# Patient Record
Sex: FEMALE | Race: WHITE | NOT HISPANIC OR LATINO | Employment: STUDENT | URBAN - METROPOLITAN AREA
[De-identification: names, ages, dates, MRNs, and addresses within clinical notes are randomized per-mention and may not be internally consistent; named-entity substitution may affect disease eponyms.]

---
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
## 2023-04-20 ENCOUNTER — APPOINTMENT (OUTPATIENT)
Dept: RADIOLOGY | Facility: CLINIC | Age: 18
End: 2023-04-20

## 2023-04-20 DIAGNOSIS — M25.561 ACUTE PAIN OF RIGHT KNEE: ICD-10-CM

## 2023-04-20 PROBLEM — S89.91XA RIGHT KNEE INJURY: Status: ACTIVE | Noted: 2023-04-20

## 2023-04-20 PROBLEM — M25.461 EFFUSION OF RIGHT KNEE: Status: ACTIVE | Noted: 2023-04-20

## 2023-04-20 PROBLEM — M23.91: Status: ACTIVE | Noted: 2023-04-20

## 2023-04-21 ENCOUNTER — TELEPHONE (OUTPATIENT)
Dept: OBGYN CLINIC | Facility: CLINIC | Age: 18
End: 2023-04-21

## 2023-04-21 NOTE — TELEPHONE ENCOUNTER
I returned mom [address] phone call  I offered to make an appointment for following up after MRI and going over results  Daughter is back in school and mother is going back to [place] this evening  I advised that I would forward her message to you and that daughter would be able to see results in her [address]

## 2023-04-21 NOTE — TELEPHONE ENCOUNTER
Caller: Pts mom, Laura    Doctor: Nicanor Aldana    Reason for call: Looking for MRI results   Advised it has not yet been read, but will forward message    Call back#: [phone] ; Felix Espinosa

## 2023-04-24 ENCOUNTER — TELEPHONE (OUTPATIENT)
Dept: OBGYN CLINIC | Facility: HOSPITAL | Age: 18
End: 2023-04-24

## 2023-06-13 ENCOUNTER — OFFICE VISIT (OUTPATIENT)
Dept: FAMILY MEDICINE CLINIC | Facility: HOSPITAL | Age: 18
End: 2023-06-13
Payer: COMMERCIAL

## 2023-06-13 VITALS
HEIGHT: 62 IN | WEIGHT: 127 LBS | SYSTOLIC BLOOD PRESSURE: 116 MMHG | TEMPERATURE: 98.8 F | BODY MASS INDEX: 23.37 KG/M2 | DIASTOLIC BLOOD PRESSURE: 64 MMHG

## 2023-06-13 DIAGNOSIS — Z00.00 ANNUAL PHYSICAL EXAM: Primary | ICD-10-CM

## 2023-06-13 DIAGNOSIS — S89.91XS INJURY OF RIGHT KNEE, SEQUELA: ICD-10-CM

## 2023-06-13 PROCEDURE — 99395 PREV VISIT EST AGE 18-39: CPT | Performed by: NURSE PRACTITIONER

## 2023-06-13 NOTE — PROGRESS NOTES
[address] [facility] [address]     NAME: Parish Adan  AGE: 19 y o  SEX: female  : 2005     DATE: 2023     Assessment and Plan:     Problem List Items Addressed This Visit        Other    Right knee injury     scheduled for surgery at [facility] this week        Other Visit Diagnoses     Annual physical exam    -  Primary    PE updated, next due in 1 year; continue f/u with gyn as planned          Immunizations and preventive care screenings were discussed with patient today  Appropriate education was printed on patient's after visit summary  Counseling:  Alcohol/drug use: discussed moderation in alcohol intake, the recommendations for healthy alcohol use, and avoidance of illicit drug use  Dental Health: discussed importance of regular tooth brushing, flossing, and dental visits  Sexual health: discussed sexually transmitted diseases, partner selection, use of condoms, avoidance of unintended pregnancy, and contraceptive alternatives  · Exercise: the importance of regular exercise/physical activity was discussed  Recommend exercise 3-5 times per week for at least 30 minutes  Depression Screening and Follow-up Plan: Patient was screened for depression during today's encounter  They screened negative with a PHQ-2 score of 0  Return in about 1 year (around 2024) for Annual physical      Chief Complaint:     Chief Complaint   Patient presents with   • Physical Exam      History of Present Illness:     Adult Annual Physical   Patient here for a comprehensive physical exam  The patient reports problems - per ROS  Diet and Physical Activity  · Diet/Nutrition: well balanced diet and limited junk food  · Exercise: vigorous cardiovascular exercise   Currently not exercising due to knee injury     Depression Screening  PHQ-2/9 Depression Screening    Little interest or pleasure in doing things: 0 - not at all  Feeling down, depressed, or hopeless: 0 - not at all  PHQ-2 Score: 0  PHQ-2 Interpretation: Negative depression screen       General Health  · Sleep: sleeps well  · Hearing: normal - bilateral   · Vision: no vision problems and goes for regular eye exams  · Dental: regular dental visits  /GYN Health  · Last menstrual period: 6/2023  · Contraceptive method: oral contraceptives  Review of Systems:     Review of Systems   Constitutional: Positive for activity change (due to right knee injury)  HENT: Negative  Eyes: Negative  Respiratory: Negative  Cardiovascular: Negative  Gastrointestinal: Negative  Genitourinary: Negative  Musculoskeletal: Positive for arthralgias (right knee soccer injury - ACL, possible MCL tears - scheduled for surgery @ [facility] this week)  Skin: Negative  Neurological: Negative  Psychiatric/Behavioral: Negative  Past Medical History:     Past Medical History:   Diagnosis Date   • Asthma    • Migraines    • Mild intermittent asthma, uncomplicated 65/33/9186    Formatting of this note might be different from the original  Doing much better  No on maintenance ICS for 1 year   Albuterol prn now      Past Surgical History:     Past Surgical History:   Procedure Laterality Date   • KNEE SURGERY     • MYRINGOTOMY W/ TUBES     • SINUS SURGERY     • WISDOM TOOTH EXTRACTION        Social History:     Social History     Socioeconomic History   • Marital status: Single     Spouse name: None   • Number of children: None   • Years of education: None   • Highest education level: None   Occupational History   • None   Tobacco Use   • Smoking status: Never   • Smokeless tobacco: Never   Vaping Use   • Vaping Use: Never used   Substance and Sexual Activity   • Alcohol use: Never   • Drug use: Never   • Sexual activity: Yes     Partners: Male     Birth control/protection: OCP   Other Topics Concern   • None   Social History Narrative   • None     Social "Determinants of Health     Financial Resource Strain: Not on file   Food Insecurity: Not on file   Transportation Needs: Not on file   Physical Activity: Not on file   Stress: Not on file   Social Connections: Not on file   Intimate Partner Violence: Not on file   Housing Stability: Not on file      Family History:     Family History   Problem Relation Age of Onset   • No Known Problems Mother    • Heart disease Father    • Hypertension Father    • Coronary artery disease Father    • Diabetes Father    • Prostate cancer Paternal Grandfather    • Breast cancer Neg Hx    • Uterine cancer Neg Hx    • Ovarian cancer Neg Hx    • Colon cancer Neg Hx       Current Medications:     Current Outpatient Medications   Medication Sig Dispense Refill   • norethindrone-ethinyl estradiol (Junel 1/20) 1-20 MG-MCG per tablet Take 1 tablet by mouth daily 90 tablet 0   • acetaminophen (TYLENOL) 500 mg tablet Take 500 mg by mouth every 6 (six) hours as needed for mild pain (Patient not taking: Reported on 6/13/2023)       No current facility-administered medications for this visit  Allergies:     No Known Allergies   Physical Exam:     /64   Temp 98 8 °F (37 1 °C)   Ht 5' 1 5\" (1 562 m)   Wt 57 6 kg (127 lb)   BMI 23 61 kg/m²     Physical Exam  Vitals reviewed  Constitutional:       General: She is not in acute distress  Appearance: Normal appearance  HENT:      Head: Normocephalic  Right Ear: Tympanic membrane normal       Left Ear: Tympanic membrane normal       Nose: Nose normal       Mouth/Throat:      Mouth: Mucous membranes are moist       Pharynx: Oropharynx is clear  Eyes:      General: No scleral icterus  Neck:      Thyroid: No thyromegaly  Cardiovascular:      Rate and Rhythm: Normal rate and regular rhythm  Heart sounds: No murmur heard  Pulmonary:      Effort: Pulmonary effort is normal  No respiratory distress  Breath sounds: Normal breath sounds     Abdominal:      General: " Abdomen is flat  Bowel sounds are normal       Palpations: Abdomen is soft  Tenderness: There is no abdominal tenderness  There is no guarding or rebound  Musculoskeletal:         General: Signs of injury (right knee, wearing leg brace) present  Normal range of motion  Cervical back: Normal range of motion  Right lower leg: No edema  Left lower leg: No edema  Lymphadenopathy:      Cervical: No cervical adenopathy  Skin:     General: Skin is warm and dry  Neurological:      General: No focal deficit present  Mental Status: She is alert and oriented to person, place, and time  Psychiatric:         Mood and Affect: Mood normal          Behavior: Behavior normal          Thought Content:  Thought content normal          Judgment: Judgment normal           TISHA Levine   [address]  [address]

## 2024-04-08 NOTE — PROGRESS NOTES
"  [facility]   Seble Martins DO    Assessment/Plan:      Diagnosis ICD-10-CM Associated Orders   1. Near syncope  R55 Ambulatory Referral to Cardiology     Holter monitor     Basic metabolic panel     CBC and Platelet     TSH, 3rd generation     T4, free     Magnesium     Phosphorus     Basic metabolic panel     CBC and Platelet     TSH, 3rd generation     T4, free     Magnesium     Phosphorus      2. Racing heart beat  R00.0 Ambulatory Referral to Cardiology     Holter monitor     Basic metabolic panel     CBC and Platelet     TSH, 3rd generation     T4, free     Magnesium     Phosphorus     Basic metabolic panel     CBC and Platelet     TSH, 3rd generation     T4, free     Magnesium     Phosphorus      3. Leukocytosis, unspecified type  D72.829 CBC and Platelet     CBC and Platelet      4. Family history of hypothyroidism  Z83.49 TSH, 3rd generation     T4, free     TSH, 3rd generation     T4, free        Orders as above for BW. R/O thyroid causation among others.   Have Holter completed & f/u with cardiology for further review of s/s & EKG & holter. Will likely need \"cardiac clearance\" for full duty fall soccer. Will possibly need ECHO at that time as well.   I did give a school/ sports note today in office.   Stay well hydrated & have snacks nearby.   Could be 2/2 viral illness over holidays/ long haul covid? Post viral palpitations/tachycardia?   Meds reviewed. OCP likely not playing role.   Return if symptoms worsen or fail to improve.  See PCP as typically scheduled.   Patient may call or return to office with any questions or concerns.   ______________________________________________________________________  Subjective:     Patient ID: Valery Frazier is a 19 y.o. female.  Valery Frazier  Chief Complaint   Patient presents with   • Fatigue     Feels like HR is high and feels like may pass out      Last Monday while at [employer] game -   While at [place] event, hot room, started sweating, " eyesight seemed altered. Blurry. Black spots also. Not dehydrated to her knowledge.   Ate some [employer] sandwiches, 3 hr drive, then changed cars, drove further to game.   Presyncope. Given water & cold towel.   Told vitals were nml.   Had taken a sinus med that day also. Not again since. COVID neg at that time.   Next day HR was 130's all day.     Again happened Saturday, walking dog, a mile. Felt shaky.   Not hot out. Had just eaten.   Drinking tons of water that day.   Not as high of HR this time.     Only med is OCP. No changes.     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     Running has her HR at 180-190 & lifting can be 170s.   Had COVID in 2020 Nov.     CTH & CXR normal.   No fam HX of cardiac concerns. Only GF had MI in 70's.     Wt Readings from Last 3 Encounters:   04/09/24 55.3 kg (122 lb)   06/13/23 57.6 kg (127 lb)   04/20/23 54.4 kg (120 lb)     Temp Readings from Last 3 Encounters:   06/13/23 98.8 °F (37.1 °C)   06/06/22 98.7 °F (37.1 °C)   06/02/19 (!) 101.2 °F (38.4 °C)     BP Readings from Last 3 Encounters:   04/09/24 100/72   06/13/23 116/64   04/20/23 120/82     Pulse Readings from Last 3 Encounters:   04/09/24 105   06/06/22 72   06/02/19 (!) 120      The following portions of the patient's history were reviewed and updated as appropriate: allergies, current medications, past medical history, and problem list.    Review of Systems   Constitutional:  Positive for fatigue.   Eyes:  Positive for visual disturbance. Negative for photophobia.   Respiratory:  Negative for cough, chest tightness and shortness of breath.    Cardiovascular:  Positive for palpitations (feels faster, not skipping). Negative for chest pain and leg swelling.   Gastrointestinal:  Negative for diarrhea, nausea and vomiting.   Genitourinary:  Negative for dysuria and urgency.   Neurological:  Positive for light-headedness. Negative for dizziness and headaches.   Psychiatric/Behavioral:  Negative for sleep disturbance.        Objective:   "    Vitals:    04/09/24 1419   BP: 100/72   Pulse: 105   SpO2: 99%   RPT - 89 while sitting longer      Physical Exam  Vitals and nursing note reviewed.   Constitutional:       General: She is not in acute distress.     Appearance: Normal appearance. She is normal weight. She is not ill-appearing.   HENT:      Head: Normocephalic and atraumatic.   Eyes:      General: No scleral icterus.        Right eye: No discharge.         Left eye: No discharge.   Neck:      Comments: No thyroid nodules or thyromegaly.  Cardiovascular:      Rate and Rhythm: Regular rhythm. Tachycardia present.      Pulses: Normal pulses.      Heart sounds: Normal heart sounds. No murmur heard.  Pulmonary:      Effort: Pulmonary effort is normal. No respiratory distress.      Breath sounds: Normal breath sounds. No stridor. No wheezing.   Musculoskeletal:      Cervical back: Normal range of motion and neck supple. No rigidity or tenderness.      Right lower leg: No edema.      Left lower leg: No edema.   Lymphadenopathy:      Cervical: No cervical adenopathy.   Neurological:      Mental Status: She is alert and oriented to person, place, and time.      Gait: Gait normal.   Psychiatric:         Mood and Affect: Mood normal.         Behavior: Behavior normal.         Thought Content: Thought content normal.         Judgment: Judgment normal.      Comments: Does not appear very anxious           Portions of the record may have been created with voice recognition software. Occasional wrong word or \"sound alike\" substitutions may have occurred due to the inherent limitations of voice recognition software. Please review the chart carefully and recognize, using context, where substitutions/typographical errors may have occurred.     "

## 2024-04-09 ENCOUNTER — OFFICE VISIT (OUTPATIENT)
Dept: FAMILY MEDICINE CLINIC | Facility: HOSPITAL | Age: 19
End: 2024-04-09
Payer: COMMERCIAL

## 2024-04-09 VITALS
HEART RATE: 105 BPM | BODY MASS INDEX: 22.45 KG/M2 | OXYGEN SATURATION: 99 % | DIASTOLIC BLOOD PRESSURE: 72 MMHG | HEIGHT: 62 IN | SYSTOLIC BLOOD PRESSURE: 100 MMHG | WEIGHT: 122 LBS

## 2024-04-09 DIAGNOSIS — Z83.49 FAMILY HISTORY OF HYPOTHYROIDISM: ICD-10-CM

## 2024-04-09 DIAGNOSIS — R00.0 RACING HEART BEAT: ICD-10-CM

## 2024-04-09 DIAGNOSIS — D72.829 LEUKOCYTOSIS, UNSPECIFIED TYPE: ICD-10-CM

## 2024-04-09 DIAGNOSIS — R55 NEAR SYNCOPE: Primary | ICD-10-CM

## 2024-04-09 PROBLEM — S83.511A: Status: ACTIVE | Noted: 2023-06-12

## 2024-04-09 PROCEDURE — 99214 OFFICE O/P EST MOD 30 MIN: CPT | Performed by: STUDENT IN AN ORGANIZED HEALTH CARE EDUCATION/TRAINING PROGRAM

## 2024-04-09 RX ORDER — NORETHINDRONE ACETATE AND ETHINYL ESTRADIOL AND FERROUS FUMARATE 1MG-20(21)
1 KIT ORAL DAILY
COMMUNITY
Start: 2024-01-31

## 2024-04-10 ENCOUNTER — HOSPITAL ENCOUNTER (OUTPATIENT)
Dept: NON INVASIVE DIAGNOSTICS | Age: 19
Discharge: HOME | End: 2024-04-10
Payer: COMMERCIAL

## 2024-04-10 DIAGNOSIS — R00.0 RACING HEART BEAT: ICD-10-CM

## 2024-04-10 DIAGNOSIS — R55 NEAR SYNCOPE: ICD-10-CM

## 2024-04-10 PROCEDURE — 93226 XTRNL ECG REC<48 HR SCAN A/R: CPT

## 2024-04-10 PROCEDURE — 93225 XTRNL ECG REC<48 HRS REC: CPT

## 2024-04-16 LAB
BUN SERPL-MCNC: 16 MG/DL (ref 6–20)
BUN/CREAT SERPL: 21 (ref 9–23)
CALCIUM SERPL-MCNC: 9.7 MG/DL (ref 8.7–10.2)
CHLORIDE SERPL-SCNC: 104 MMOL/L (ref 96–106)
CO2 SERPL-SCNC: 22 MMOL/L (ref 20–29)
CREAT SERPL-MCNC: 0.76 MG/DL (ref 0.57–1)
EGFR: 114 ML/MIN/1.73
ERYTHROCYTE [DISTWIDTH] IN BLOOD BY AUTOMATED COUNT: 12.2 % (ref 11.7–15.4)
GLUCOSE SERPL-MCNC: 91 MG/DL (ref 70–99)
HCT VFR BLD AUTO: 43.5 % (ref 34–46.6)
HGB BLD-MCNC: 14.1 G/DL (ref 11.1–15.9)
MAGNESIUM SERPL-MCNC: 1.9 MG/DL (ref 1.6–2.3)
MCH RBC QN AUTO: 29.5 PG (ref 26.6–33)
MCHC RBC AUTO-ENTMCNC: 32.4 G/DL (ref 31.5–35.7)
MCV RBC AUTO: 91 FL (ref 79–97)
PHOSPHATE SERPL-MCNC: 3.4 MG/DL (ref 3–4.3)
PLATELET # BLD AUTO: 371 X10E3/UL (ref 150–450)
POTASSIUM SERPL-SCNC: 4.4 MMOL/L (ref 3.5–5.2)
RBC # BLD AUTO: 4.78 X10E6/UL (ref 3.77–5.28)
SODIUM SERPL-SCNC: 138 MMOL/L (ref 134–144)
T4 FREE SERPL-MCNC: 1.17 NG/DL (ref 0.82–1.77)
TSH SERPL DL<=0.005 MIU/L-ACNC: 0.95 UIU/ML (ref 0.45–4.5)
WBC # BLD AUTO: 12.7 X10E3/UL (ref 3.4–10.8)

## 2024-08-12 ENCOUNTER — OFFICE VISIT (OUTPATIENT)
Dept: FAMILY MEDICINE CLINIC | Facility: HOSPITAL | Age: 19
End: 2024-08-12
Payer: COMMERCIAL

## 2024-08-12 VITALS
TEMPERATURE: 97.2 F | SYSTOLIC BLOOD PRESSURE: 122 MMHG | WEIGHT: 118.6 LBS | BODY MASS INDEX: 21.83 KG/M2 | HEIGHT: 62 IN | OXYGEN SATURATION: 98 % | DIASTOLIC BLOOD PRESSURE: 72 MMHG | HEART RATE: 84 BPM

## 2024-08-12 DIAGNOSIS — F41.9 ANXIETY: Primary | ICD-10-CM

## 2024-08-12 PROCEDURE — 99214 OFFICE O/P EST MOD 30 MIN: CPT | Performed by: NURSE PRACTITIONER

## 2024-08-12 RX ORDER — ESCITALOPRAM OXALATE 10 MG/1
10 TABLET ORAL DAILY
Qty: 30 TABLET | Refills: 1 | Status: SHIPPED | OUTPATIENT
Start: 2024-08-12

## 2024-08-12 NOTE — ASSESSMENT & PLAN NOTE
MECHE-7=12  PHQ-9=0  We discussed prn vs daily options for anxiety.   She is interested in daily option.   Will start on lexapro. AE discussed. Black box warning discussed.   She will call with any worsening mood or SI.   Plan to f/u in 4 weeks virtually since she is returning to school today.

## 2024-08-12 NOTE — PROGRESS NOTES
Ambulatory Visit  Name: Valery Frazier      : 2005      MRN: [id number]  Encounter Provider: TISHA Conner  Encounter Date: 2024   Encounter department: [facility] [address]     Assessment & Plan   1. Anxiety  Assessment & Plan:  MECHE-7=12  PHQ-9=0  We discussed prn vs daily options for anxiety.   She is interested in daily option.   Will start on lexapro. AE discussed. Black box warning discussed.   She will call with any worsening mood or SI.   Plan to f/u in 4 weeks virtually since she is returning to school today.     Orders:  -     escitalopram (Lexapro) 10 mg tablet; Take 1 tablet (10 mg total) by mouth daily      Depression Screening and Follow-up Plan: Patient was screened for depression during today's encounter. They screened negative with a PHQ-2 score of 0.        History of Present Illness     Feels anxious all the time. Has been going on for about 3-4 months. Denies any triggers. In college but anxiety is there even though she has been out of school. Denies h/o anxiety or depression. Anxiety comes in waves. There daily but waxes and wanes in intensity. Had syncopal event at school that could have been anxiety. Reports she was feeling nervous prior to syncopal event. She reports she was worked up for this and everything was normal.       Review of Systems   Constitutional:  Negative for appetite change, fatigue and unexpected weight change.   Psychiatric/Behavioral:  Negative for agitation, decreased concentration, dysphoric mood, sleep disturbance and suicidal ideas. The patient is nervous/anxious. The patient is not hyperactive.      Past Medical History:   Diagnosis Date    Asthma     Migraines     Mild intermittent asthma, uncomplicated 2020    Formatting of this note might be different from the original. Doing much better.  No on maintenance ICS for 1 year. Albuterol prn now     Past Surgical History:   Procedure Laterality Date    KNEE SURGERY       MYRINGOTOMY W/ TUBES      SINUS SURGERY      WISDOM TOOTH EXTRACTION       Family History   Problem Relation Age of Onset    No Known Problems Mother     Heart disease Father     Hypertension Father     Coronary artery disease Father     Diabetes Father     Prostate cancer Paternal Grandfather     Breast cancer Neg Hx     Uterine cancer Neg Hx     Ovarian cancer Neg Hx     Colon cancer Neg Hx      Social History     Tobacco Use    Smoking status: Never    Smokeless tobacco: Never   Vaping Use    Vaping status: Never Used   Substance and Sexual Activity    Alcohol use: Never    Drug use: Never    Sexual activity: Yes     Partners: Male     Birth control/protection: OCP     Current Outpatient Medications on File Prior to Visit   Medication Sig    Aurovela FE 1/20 1-20 MG-MCG per tablet Take 1 tablet by mouth daily     No Known Allergies  Immunization History   Administered Date(s) Administered    COVID-19 PFIZER VACCINE 0.3 ML IM 04/16/2021, 05/07/2021, 01/03/2022    DTaP 2003, 2003, 2003, 11/08/2004, 02/26/2007    HPV Quadrivalent 05/07/2014, 07/15/2014, 12/04/2014    Hep B, Adolescent or Pediatric 2003, 2003, 2003    Hepatitis A 02/26/2007, 02/19/2008    Hib (PRP-T) 2003, 2003, 2003, 11/08/2004    INFLUENZA 2003, 2003, 10/30/2004, 10/12/2005, 10/20/2006, 10/16/2007, 09/25/2008, 09/30/2011, 09/27/2012, 10/02/2013, 10/07/2014, 09/27/2015, 10/08/2018    IPV 2003, 2003, 11/08/2004, 02/26/2007    Influenza Injectable, MDCK, Preservative Free, Quadrivalent, 0.5 mL 10/14/2019    Influenza LAIV (Nasal) 09/22/2009    Influenza Quadrivalent Preservative Free 3 years and older IM 09/12/2020    MMR 02/17/2004, 02/19/2008    Meningococcal B, OMV (BEXSERO) 07/23/2020, 11/05/2020    Meningococcal Conjugate (MCV4O) 07/22/2019    Meningococcal MCV4, Unspecified 05/07/2014, 07/22/2019    Meningococcal MCV4P 05/07/2014, 07/22/2019    Pneumococcal Conjugate  "PCV 7 2003, 2003, 2003, 11/08/2004    Tdap 05/07/2014    Varicella 02/17/2004, 02/19/2008     Objective     /72 (BP Location: Left arm, Patient Position: Sitting, Cuff Size: Standard)   Pulse 84   Temp (!) 97.2 °F (36.2 °C) (Tympanic)   Ht 5' 2\" (1.575 m)   Wt 53.8 kg (118 lb 9.6 oz)   SpO2 98%   BMI 21.69 kg/m²     Physical Exam  Vitals reviewed.   Constitutional:       Appearance: Normal appearance. She is normal weight.   Cardiovascular:      Rate and Rhythm: Normal rate and regular rhythm.      Heart sounds: Normal heart sounds. No murmur heard.  Pulmonary:      Effort: Pulmonary effort is normal.      Breath sounds: Normal breath sounds.   Skin:     General: Skin is warm and dry.   Neurological:      Mental Status: She is alert and oriented to person, place, and time.   Psychiatric:         Mood and Affect: Mood normal.         Behavior: Behavior normal.         Thought Content: Thought content normal.         Judgment: Judgment normal.       Administrative Statements   I have spent a total time of 15 minutes in caring for this patient on the day of the visit/encounter including Risks and benefits of tx options, Instructions for management, Counseling / Coordination of care, Documenting in the medical record, Reviewing / ordering tests, medicine, procedures  , and Obtaining or reviewing history  .  "

## 2024-09-11 ENCOUNTER — TELEMEDICINE (OUTPATIENT)
Dept: FAMILY MEDICINE CLINIC | Facility: HOSPITAL | Age: 19
End: 2024-09-11
Payer: COMMERCIAL

## 2024-09-11 VITALS — HEIGHT: 62 IN | WEIGHT: 118 LBS | BODY MASS INDEX: 21.71 KG/M2

## 2024-09-11 DIAGNOSIS — F41.9 ANXIETY: Primary | ICD-10-CM

## 2024-09-11 PROCEDURE — 99213 OFFICE O/P EST LOW 20 MIN: CPT | Performed by: NURSE PRACTITIONER

## 2024-09-11 RX ORDER — ESCITALOPRAM OXALATE 10 MG/1
10 TABLET ORAL DAILY
Qty: 90 TABLET | Refills: 1 | Status: SHIPPED | OUTPATIENT
Start: 2024-09-11

## 2024-09-11 NOTE — PROGRESS NOTES
"Virtual Regular Visit  Name: Valery Frazier      : 2005      MRN: [id number]  Encounter Provider: TISHA Conner  Encounter Date: 2024   Encounter department: [facility] CARE [address]     Verification of patient location:    Patient is located at Other in the following state in which I hold an active license [place]    Assessment & Plan  Anxiety  MECHE-7=0  She feels overall her anxiety has significantly improved. She is tolerating well.   She is requesting stay on lexapro 10 mg.   Plan to f/u in 6 months in office during her winter break.   She will call in the interim should she desire dose increase or have any problems.     Orders:    escitalopram (Lexapro) 10 mg tablet; Take 1 tablet (10 mg total) by mouth daily         Encounter provider TISHA Conner    The patient was identified by name and date of birth. Valery Frazier was informed that this is a telemedicine visit and that the visit is being conducted through the Epic Embedded platform. She agrees to proceed..  My office door was closed. No one else was in the room.  She acknowledged consent and understanding of privacy and security of the video platform. The patient has agreed to participate and understands they can discontinue the visit at any time.    Patient is aware this is a billable service.     History of Present Illness     Feeling calmer overall. Started lexapro. No side effects. Tolerating it well. Thinks she has had about 75% reduction in symptoms.         History obtained from : patient  Review of Systems   Constitutional:  Negative for appetite change and fatigue.   Psychiatric/Behavioral:  Negative for decreased concentration and sleep disturbance. The patient is not nervous/anxious and is not hyperactive.            Objective     Ht 5' 2\" (1.575 m)   Wt 53.5 kg (118 lb)   BMI 21.58 kg/m²   Physical Exam  Vitals reviewed.   Constitutional:       General: She is not in acute distress.     Appearance: " Normal appearance.   Pulmonary:      Effort: Pulmonary effort is normal.   Neurological:      Mental Status: She is alert and oriented to person, place, and time.   Psychiatric:         Mood and Affect: Mood normal.         Behavior: Behavior normal.         Thought Content: Thought content normal.         Judgment: Judgment normal.         Visit Time  Total Visit Duration: 10 min

## 2024-09-11 NOTE — ASSESSMENT & PLAN NOTE
MECHE-7=0  She feels overall her anxiety has significantly improved. She is tolerating well.   She is requesting stay on lexapro 10 mg.   Plan to f/u in 6 months in office during her winter break.   She will call in the interim should she desire dose increase or have any problems.     Orders:    escitalopram (Lexapro) 10 mg tablet; Take 1 tablet (10 mg total) by mouth daily

## 2024-09-12 ENCOUNTER — TELEPHONE (OUTPATIENT)
Dept: FAMILY MEDICINE CLINIC | Facility: HOSPITAL | Age: 19
End: 2024-09-12

## 2024-09-12 NOTE — TELEPHONE ENCOUNTER
Left message for call back to office.    Please schedule an in office follow-up w/ Yari Winter for during patient's winter break from school.

## 2024-09-25 ENCOUNTER — TELEMEDICINE (OUTPATIENT)
Dept: FAMILY MEDICINE CLINIC | Facility: HOSPITAL | Age: 19
End: 2024-09-25
Payer: COMMERCIAL

## 2024-09-25 ENCOUNTER — TELEPHONE (OUTPATIENT)
Age: 19
End: 2024-09-25

## 2024-09-25 VITALS — HEIGHT: 62 IN | WEIGHT: 118 LBS | BODY MASS INDEX: 21.71 KG/M2

## 2024-09-25 DIAGNOSIS — J06.9 VIRAL URI: Primary | ICD-10-CM

## 2024-09-25 PROCEDURE — 99213 OFFICE O/P EST LOW 20 MIN: CPT | Performed by: NURSE PRACTITIONER

## 2024-09-25 NOTE — PROGRESS NOTES
"Virtual Regular Visit  Name: Valery Frazier      : 2005      MRN: [id number]  Encounter Provider: TISHA Conner  Encounter Date: 2024   Encounter department: [facility] [address]     Verification of patient location:    Patient is located at Other in the following state in which I hold an active license [place]    Assessment & Plan  Viral URI  Home COVID test done yesterday was negative.   Her symptoms are of short duration.   We discussed viral etiology and symptomatic support.   Instructed to call if no better in 1 week or any significant worsening.             Encounter provider TISHA Conner    The patient was identified by name and date of birth. Valery Frazier was informed that this is a telemedicine visit and that the visit is being conducted through the Epic Embedded platform. She agrees to proceed..  My office door was closed. No one else was in the room.  She acknowledged consent and understanding of privacy and security of the video platform. The patient has agreed to participate and understands they can discontinue the visit at any time.    Patient is aware this is a billable service.     History of Present Illness     Has nasal congestion and sinus pressure, sore throat, ear pressure but no pain. Had chills yesterday. Did not check temperature. No fever today. Taking OTC cold and cough med. No N/V/D. Symptoms started 4 days ago. Took COVID test yesterday and was negative. She is away at college.         History obtained from : patient  Review of Systems   Constitutional:  Positive for chills. Negative for fever.   HENT:  Positive for congestion, sinus pressure and sore throat. Negative for ear pain.    Respiratory:  Positive for cough. Negative for shortness of breath and wheezing.    Gastrointestinal:  Negative for diarrhea, nausea and vomiting.   Neurological:  Positive for headaches.           Objective     Ht 5' 2\" (1.575 m)   Wt 53.5 kg (118 lb)   BMI " 21.58 kg/m²   Physical Exam  Vitals reviewed.   Constitutional:       General: She is not in acute distress.     Appearance: Normal appearance. She is not ill-appearing.   Pulmonary:      Effort: Pulmonary effort is normal.      Comments: No cough during visit  Neurological:      Mental Status: She is alert and oriented to person, place, and time.   Psychiatric:         Mood and Affect: Mood normal.         Behavior: Behavior normal.         Thought Content: Thought content normal.         Judgment: Judgment normal.         Visit Time  Total Visit Duration: 10 min

## 2024-09-25 NOTE — TELEPHONE ENCOUNTER
Pt called and stated her appt today at 11 was scheduled for in person and she needed it virtually. Changed appt type on appt desk, letting office know just in case thank you.

## 2024-12-13 ENCOUNTER — OFFICE VISIT (OUTPATIENT)
Dept: FAMILY MEDICINE CLINIC | Facility: HOSPITAL | Age: 19
End: 2024-12-13
Payer: COMMERCIAL

## 2024-12-13 VITALS
TEMPERATURE: 97.8 F | DIASTOLIC BLOOD PRESSURE: 74 MMHG | BODY MASS INDEX: 24.22 KG/M2 | HEIGHT: 62 IN | HEART RATE: 80 BPM | WEIGHT: 131.6 LBS | SYSTOLIC BLOOD PRESSURE: 124 MMHG

## 2024-12-13 DIAGNOSIS — F41.9 ANXIETY: Primary | ICD-10-CM

## 2024-12-13 PROBLEM — R55 NEAR SYNCOPE: Status: RESOLVED | Noted: 2024-04-09 | Resolved: 2024-12-13

## 2024-12-13 PROBLEM — S83.511A: Status: RESOLVED | Noted: 2023-06-12 | Resolved: 2024-12-13

## 2024-12-13 PROCEDURE — 99213 OFFICE O/P EST LOW 20 MIN: CPT | Performed by: NURSE PRACTITIONER

## 2024-12-13 NOTE — PROGRESS NOTES
"Name: Valery Frazier      : 2005      MRN: [id number]  Encounter Provider: TISHA Patel  Encounter Date: 2024   Encounter department: [facility] [address]   :  Assessment & Plan  Anxiety  Mood stable on daily daily lexapro  Will continue same dose as she desires - denies need for refill today  Return to see PCP in 6 months       Update annual PE at next appt       History of Present Illness       Here for med check. Has been taking lexapro for a few months for anxiety. States she is feeling better. Had constant nervousness and panic attacks before starting. Her anxiety symptoms have resolved. Denies side effects.   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 May move to [place] after graduating.        Review of Systems   Psychiatric/Behavioral:  Negative for dysphoric mood. The patient is nervous/anxious (improved on lexapro).        Objective   /74   Pulse 80   Temp 97.8 °F (36.6 °C)   Ht 5' 2\" (1.575 m)   Wt 59.7 kg (131 lb 9.6 oz)   BMI 24.07 kg/m²        Physical Exam  Vitals reviewed.   Constitutional:       General: She is not in acute distress.     Appearance: Normal appearance.   HENT:      Head: Normocephalic.   Eyes:      General: No scleral icterus.  Neck:      Thyroid: No thyromegaly.   Cardiovascular:      Rate and Rhythm: Normal rate and regular rhythm.   Pulmonary:      Effort: Pulmonary effort is normal. No respiratory distress.      Breath sounds: Normal breath sounds.   Musculoskeletal:      Cervical back: Normal range of motion.   Lymphadenopathy:      Cervical: No cervical adenopathy.   Skin:     General: Skin is warm and dry.   Neurological:      General: No focal deficit present.      Mental Status: She is alert and oriented to person, place, and time.   Psychiatric:         Mood and Affect: Mood normal.         Behavior: Behavior normal.         Thought Content: Thought content normal.         Judgment: Judgment normal.      Comments: " Relaxed, freely conversive w/good eye contact         Administrative Statements   I have spent a total time of 15 minutes in caring for this patient on the day of the visit/encounter including Instructions for management, Patient and family education, Importance of tx compliance, Impressions, Counseling / Coordination of care, Documenting in the medical record, Reviewing / ordering tests, medicine, procedures  , and Obtaining or reviewing history

## 2024-12-13 NOTE — ASSESSMENT & PLAN NOTE
Mood stable on daily daily lexapro  Will continue same dose as she desires - denies need for refill today  Return to see PCP in 6 months

## 2025-02-17 ENCOUNTER — TELEMEDICINE (OUTPATIENT)
Dept: FAMILY MEDICINE CLINIC | Facility: HOSPITAL | Age: 20
End: 2025-02-17
Payer: COMMERCIAL

## 2025-02-17 VITALS — WEIGHT: 125 LBS | BODY MASS INDEX: 23 KG/M2 | HEIGHT: 62 IN

## 2025-02-17 DIAGNOSIS — R14.0 ABDOMINAL BLOATING: ICD-10-CM

## 2025-02-17 DIAGNOSIS — R10.84 GENERALIZED ABDOMINAL PAIN: Primary | ICD-10-CM

## 2025-02-17 PROCEDURE — 99214 OFFICE O/P EST MOD 30 MIN: CPT | Performed by: NURSE PRACTITIONER

## 2025-02-17 NOTE — PROGRESS NOTES
Virtual Regular VisitName: Valery Frazier      : 2005      MRN: [id number]  Encounter Provider: TISHA Conner  Encounter Date: 2025   Encounter department: [facility] CARE [address]   :  Assessment & Plan  Generalized abdominal pain  Given symptoms have been waxing and waning for a few months will go ahead and order labs and US.   We discussed if above w/u is negative she can certainly try eliminating gluten but would need to be strict gluten free for a minimum of 8 weeks.   She could also do the same for dairy if she feels her symptoms are correlated with ingestion of dairy.  She is aware to not try eliminating both at the same time.   To note-she has started lexapro in August. Can also consider this as AE.     Orders:    CBC and differential; Future    Comprehensive metabolic panel; Future    Amylase; Future    Lipase; Future    TSH, 3rd generation with Free T4 reflex; Future    Celiac Antibodies Profile; Future    Calprotectin,Fecal; Future    US abdomen complete; Future    Abdominal bloating    Orders:    CBC and differential; Future    Comprehensive metabolic panel; Future    Amylase; Future    Lipase; Future    TSH, 3rd generation with Free T4 reflex; Future    Celiac Antibodies Profile; Future    Calprotectin,Fecal; Future    US abdomen complete; Future        History of Present Illness     Has saritha having stomach issues. Has bloating. Gets abdominal pain and heartburn. Most  recent episode has been going on for about 2 weeks. Episodes have occurred before but lasted ezequiel 1 day. This is the longest it has lasted. No N/V/D. Feels constipated. LBM was 2 days ago. Has been soft. Symptoms are worse after eating. Symptoms started a few months ago. No stomach issues before that. No blood in stool. Cousin diagnosed with IBS. Denies any excess stress.       Review of Systems   Constitutional:  Negative for appetite change, chills, fever and unexpected weight change.  "  Gastrointestinal:  Positive for abdominal distention, abdominal pain and constipation. Negative for blood in stool, diarrhea, nausea and vomiting.   Psychiatric/Behavioral:  The patient is not nervous/anxious.        Objective   Ht 5' 2\" (1.575 m)   Wt 56.7 kg (125 lb)   BMI 22.86 kg/m²     Physical Exam  Vitals reviewed.   Constitutional:       General: She is not in acute distress.     Appearance: Normal appearance. She is not ill-appearing.   Pulmonary:      Effort: Pulmonary effort is normal.   Neurological:      Mental Status: She is alert and oriented to person, place, and time.   Psychiatric:         Mood and Affect: Mood normal.         Behavior: Behavior normal.         Thought Content: Thought content normal.         Judgment: Judgment normal.         Administrative Statements   Encounter provider TISHA Conner    The Patient is located at Other and in the following state in which I hold an active license [place].    The patient was identified by name and date of birth. Valery Frazier was informed that this is a telemedicine visit and that the visit is being conducted through the Epic Embedded platform. She agrees to proceed..  My office door was closed. No one else was in the room.  She acknowledged consent and understanding of privacy and security of the video platform. The patient has agreed to participate and understands they can discontinue the visit at any time.    I have spent a total time of 20 minutes in caring for this patient on the day of the visit/encounter including Instructions for management, Impressions, Documenting in the medical record, Reviewing/placing orders in the medical record (including tests, medications, and/or procedures), and Obtaining or reviewing history  .    "

## 2025-02-19 LAB
ALBUMIN SERPL-MCNC: 4.3 G/DL (ref 4–5)
ALP SERPL-CCNC: 61 IU/L (ref 44–121)
ALT SERPL-CCNC: 16 IU/L (ref 0–32)
AMYLASE SERPL-CCNC: 62 U/L (ref 31–110)
AST SERPL-CCNC: 22 IU/L (ref 0–40)
BASOPHILS # BLD AUTO: 0 X10E3/UL (ref 0–0.2)
BASOPHILS NFR BLD AUTO: 1 %
BILIRUB SERPL-MCNC: 0.5 MG/DL (ref 0–1.2)
BUN SERPL-MCNC: 15 MG/DL (ref 6–20)
BUN/CREAT SERPL: 16 (ref 9–23)
CALCIUM SERPL-MCNC: 9.4 MG/DL (ref 8.7–10.2)
CHLORIDE SERPL-SCNC: 104 MMOL/L (ref 96–106)
CO2 SERPL-SCNC: 21 MMOL/L (ref 20–29)
CREAT SERPL-MCNC: 0.96 MG/DL (ref 0.57–1)
EGFR: 86 ML/MIN/1.73
EOSINOPHIL # BLD AUTO: 0.1 X10E3/UL (ref 0–0.4)
EOSINOPHIL NFR BLD AUTO: 1 %
ERYTHROCYTE [DISTWIDTH] IN BLOOD BY AUTOMATED COUNT: 12.4 % (ref 11.7–15.4)
GLIADIN PEPTIDE IGA SER-ACNC: 5 UNITS (ref 0–19)
GLIADIN PEPTIDE IGG SER-ACNC: 1 UNITS (ref 0–19)
GLOBULIN SER-MCNC: 2.5 G/DL (ref 1.5–4.5)
GLUCOSE SERPL-MCNC: 85 MG/DL (ref 70–99)
HCT VFR BLD AUTO: 42.6 % (ref 34–46.6)
HGB BLD-MCNC: 14.1 G/DL (ref 11.1–15.9)
IGA SERPL-MCNC: 155 MG/DL (ref 87–352)
IMM GRANULOCYTES # BLD: 0 X10E3/UL (ref 0–0.1)
IMM GRANULOCYTES NFR BLD: 0 %
LIPASE SERPL-CCNC: 23 U/L (ref 14–72)
LYMPHOCYTES # BLD AUTO: 2.9 X10E3/UL (ref 0.7–3.1)
LYMPHOCYTES NFR BLD AUTO: 35 %
MCH RBC QN AUTO: 29.4 PG (ref 26.6–33)
MCHC RBC AUTO-ENTMCNC: 33.1 G/DL (ref 31.5–35.7)
MCV RBC AUTO: 89 FL (ref 79–97)
MONOCYTES # BLD AUTO: 0.4 X10E3/UL (ref 0.1–0.9)
MONOCYTES NFR BLD AUTO: 5 %
NEUTROPHILS # BLD AUTO: 4.9 X10E3/UL (ref 1.4–7)
NEUTROPHILS NFR BLD AUTO: 58 %
PLATELET # BLD AUTO: 283 X10E3/UL (ref 150–450)
POTASSIUM SERPL-SCNC: 4.4 MMOL/L (ref 3.5–5.2)
PROT SERPL-MCNC: 6.8 G/DL (ref 6–8.5)
RBC # BLD AUTO: 4.79 X10E6/UL (ref 3.77–5.28)
SODIUM SERPL-SCNC: 139 MMOL/L (ref 134–144)
TSH SERPL DL<=0.005 MIU/L-ACNC: 1.64 UIU/ML (ref 0.45–4.5)
TTG IGA SER-ACNC: <2 U/ML (ref 0–3)
TTG IGG SER-ACNC: 13 U/ML (ref 0–5)
WBC # BLD AUTO: 8.2 X10E3/UL (ref 3.4–10.8)

## 2025-02-20 ENCOUNTER — RESULTS FOLLOW-UP (OUTPATIENT)
Dept: FAMILY MEDICINE CLINIC | Facility: HOSPITAL | Age: 20
End: 2025-02-20

## 2025-02-20 DIAGNOSIS — R76.8 POSITIVE AUTOANTIBODY SCREENING FOR CELIAC DISEASE: Primary | ICD-10-CM

## 2025-02-20 LAB — CALPROTECTIN STL-MCNT: 67 UG/G (ref 0–120)

## 2025-02-26 ENCOUNTER — OFFICE VISIT (OUTPATIENT)
Age: 20
End: 2025-02-26
Payer: COMMERCIAL

## 2025-02-26 VITALS
DIASTOLIC BLOOD PRESSURE: 74 MMHG | BODY MASS INDEX: 23.74 KG/M2 | TEMPERATURE: 98.5 F | OXYGEN SATURATION: 98 % | WEIGHT: 129 LBS | HEIGHT: 62 IN | HEART RATE: 86 BPM | SYSTOLIC BLOOD PRESSURE: 116 MMHG | RESPIRATION RATE: 19 BRPM

## 2025-02-26 DIAGNOSIS — R19.7 DIARRHEA, UNSPECIFIED TYPE: ICD-10-CM

## 2025-02-26 DIAGNOSIS — R14.0 BLOATING: ICD-10-CM

## 2025-02-26 DIAGNOSIS — R76.8 POSITIVE AUTOANTIBODY SCREENING FOR CELIAC DISEASE: Primary | ICD-10-CM

## 2025-02-26 PROCEDURE — 99244 OFF/OP CNSLTJ NEW/EST MOD 40: CPT | Performed by: INTERNAL MEDICINE

## 2025-02-26 RX ORDER — SODIUM CHLORIDE, SODIUM LACTATE, POTASSIUM CHLORIDE, CALCIUM CHLORIDE 600; 310; 30; 20 MG/100ML; MG/100ML; MG/100ML; MG/100ML
125 INJECTION, SOLUTION INTRAVENOUS CONTINUOUS
OUTPATIENT
Start: 2025-02-26

## 2025-02-26 NOTE — H&P (VIEW-ONLY)
Name: Valery Frazier      : 2005      MRN: [id number]  Encounter Provider: Chandrika Redd MD  Encounter Date: 2025   Encounter department: [facility]  :  Assessment & Plan  Positive autoantibody screening for celiac disease  One year of bloating which led to the work up of celiac serology which showed normal IgA, TTG IgA but elevated TTG IgG (13; ref >9). Gliadin IgA and gliadin IgG were normal. Aforementioned blood test was on gluten diet. Currently she has been trying to avoid gluten for the past week. Given concern for celiac disease, recommend endoscopic evaluation. Recommend eating gluten food until she has her EGD. Will plan for EGD with gastric and duodenal biopsies (separate the jars for duodenal bulb and 2nd portion of the duodenum).  - discussed the risks/benefits of EGD and the need for escort. Patient agreed to proceed with diagnostic EGD   - schedule EGD (HP and celiac bx) at [place]  - once the patient has been on gluten diet for 2-4 weeks, consider repeat full panel for celiac disease    Orders:    Ambulatory Referral to Gastroenterology    EGD; Future    Diarrhea, unspecified type  Acute diarrhea. Ddx includes infection, celiac disease, microscopic colitis, IBD, etc. Fecal calprotectin on 2025 67 (borderline).   - repeat fecal calprotectin when she returns from [place]; if elevated, recommend colonoscopy  - if fecal calprotectin is normal but continues to have diarrhea, recommend colonoscopy to r/o microscopic colitis given association with celiac disease and SSRIs.   Orders:    polyethylene glycol (GOLYTELY) 4000 mL solution; Take 4,000 mL by mouth once for 1 dose    Colonoscopy; Future    EGD; Future    Calprotectin,Fecal; Future     GI BACTERIA, STOOL, PCR; Future    Clostridium difficile toxin by PCR with EIA; Future    Bloating  Maybe related to celiac disease. Will do further workup as above. For the meantime, trial of simethicone  "prn           History of Present Illness   HPI  Valery Frazier is a 20 y.o. female PMH anxiety, ACL surgeries who presents with positive celiac ab and bloating  History obtained from: patient    Bloating intermittently for the past year.  Felt constipated beginning of the month. No bm for 1.5 week at that time except for just a small amount so took some laxative.    Reports diarrhea for the past week, not on laxatives. There was one incidence that she had urge to defecate after severe abdominal cramps which led to fecal incontinence. No fevers or chills. Tried cutting down gluten this week.     No celiac disease in the family.   Family members with IBS.   Grandfather with prostate cancer    Denies dysphagia, odynophagia, nausea, vomiting, abdominal pain, unintentional weight loss, melena, BRBPR.   Reports heartburn intermittently s/p tums with improvement.     Toxic habits negative x3    Review of Systems  Current Outpatient Medications on File Prior to Visit   Medication Sig Dispense Refill    Aurovela FE 1/20 1-20 MG-MCG per tablet Take 1 tablet by mouth daily      escitalopram (Lexapro) 10 mg tablet Take 1 tablet (10 mg total) by mouth daily 90 tablet 1     No current facility-administered medications on file prior to visit.         Objective   /74 (BP Location: Left arm, Patient Position: Sitting, Cuff Size: Adult)   Pulse 86   Temp 98.5 °F (36.9 °C) (Temporal)   Resp 19   Ht 5' 2\" (1.575 m)   Wt 58.5 kg (129 lb)   SpO2 98%   BMI 23.59 kg/m²      Physical Exam  Vitals reviewed.   Constitutional:       Appearance: Normal appearance.   HENT:      Head: Normocephalic and atraumatic.   Cardiovascular:      Rate and Rhythm: Normal rate.   Pulmonary:      Effort: Pulmonary effort is normal. No respiratory distress.   Abdominal:      General: There is no distension.      Palpations: Abdomen is soft.      Tenderness: There is no abdominal tenderness.   Musculoskeletal:         General: No swelling.   Skin:   "   General: Skin is warm and dry.   Neurological:      General: No focal deficit present.      Mental Status: She is alert.

## 2025-02-26 NOTE — PROGRESS NOTES
Name: Valery Frazier      : 2005      MRN: [id number]  Encounter Provider: Chandrika Redd MD  Encounter Date: 2025   Encounter department: [facility]  :  Assessment & Plan  Positive autoantibody screening for celiac disease  One year of bloating which led to the work up of celiac serology which showed normal IgA, TTG IgA but elevated TTG IgG (13; ref >9). Gliadin IgA and gliadin IgG were normal. Aforementioned blood test was on gluten diet. Currently she has been trying to avoid gluten for the past week. Given concern for celiac disease, recommend endoscopic evaluation. Recommend eating gluten food until she has her EGD. Will plan for EGD with gastric and duodenal biopsies (separate the jars for duodenal bulb and 2nd portion of the duodenum).  - discussed the risks/benefits of EGD and the need for escort. Patient agreed to proceed with diagnostic EGD   - schedule EGD (HP and celiac bx) at [place]  - once the patient has been on gluten diet for 2-4 weeks, consider repeat full panel for celiac disease    Orders:    Ambulatory Referral to Gastroenterology    EGD; Future    Diarrhea, unspecified type  Acute diarrhea. Ddx includes infection, celiac disease, microscopic colitis, IBD, etc. Fecal calprotectin on 2025 67 (borderline).   - repeat fecal calprotectin when she returns from [place]; if elevated, recommend colonoscopy  - if fecal calprotectin is normal but continues to have diarrhea, recommend colonoscopy to r/o microscopic colitis given association with celiac disease and SSRIs.   Orders:    polyethylene glycol (GOLYTELY) 4000 mL solution; Take 4,000 mL by mouth once for 1 dose    Colonoscopy; Future    EGD; Future    Calprotectin,Fecal; Future     GI BACTERIA, STOOL, PCR; Future    Clostridium difficile toxin by PCR with EIA; Future    Bloating  Maybe related to celiac disease. Will do further workup as above. For the meantime, trial of simethicone  "prn           History of Present Illness   HPI  Valery Frazier is a 20 y.o. female PMH anxiety, ACL surgeries who presents with positive celiac ab and bloating  History obtained from: patient    Bloating intermittently for the past year.  Felt constipated beginning of the month. No bm for 1.5 week at that time except for just a small amount so took some laxative.    Reports diarrhea for the past week, not on laxatives. There was one incidence that she had urge to defecate after severe abdominal cramps which led to fecal incontinence. No fevers or chills. Tried cutting down gluten this week.     No celiac disease in the family.   Family members with IBS.   Grandfather with prostate cancer    Denies dysphagia, odynophagia, nausea, vomiting, abdominal pain, unintentional weight loss, melena, BRBPR.   Reports heartburn intermittently s/p tums with improvement.     Toxic habits negative x3    Review of Systems  Current Outpatient Medications on File Prior to Visit   Medication Sig Dispense Refill    Aurovela FE 1/20 1-20 MG-MCG per tablet Take 1 tablet by mouth daily      escitalopram (Lexapro) 10 mg tablet Take 1 tablet (10 mg total) by mouth daily 90 tablet 1     No current facility-administered medications on file prior to visit.         Objective   /74 (BP Location: Left arm, Patient Position: Sitting, Cuff Size: Adult)   Pulse 86   Temp 98.5 °F (36.9 °C) (Temporal)   Resp 19   Ht 5' 2\" (1.575 m)   Wt 58.5 kg (129 lb)   SpO2 98%   BMI 23.59 kg/m²      Physical Exam  Vitals reviewed.   Constitutional:       Appearance: Normal appearance.   HENT:      Head: Normocephalic and atraumatic.   Cardiovascular:      Rate and Rhythm: Normal rate.   Pulmonary:      Effort: Pulmonary effort is normal. No respiratory distress.   Abdominal:      General: There is no distension.      Palpations: Abdomen is soft.      Tenderness: There is no abdominal tenderness.   Musculoskeletal:         General: No swelling.   Skin:   "   General: Skin is warm and dry.   Neurological:      General: No focal deficit present.      Mental Status: She is alert.

## 2025-02-27 ENCOUNTER — TELEPHONE (OUTPATIENT)
Age: 20
End: 2025-02-27

## 2025-02-27 NOTE — TELEPHONE ENCOUNTER
Per Erica Joseph! MRN [id number] was just scheduled for a colonoscopy and EGD. Patient has [facility] and cannot have services performed at [facility]. Patient will need to be scheduled at [place], [place], [place], [facility], [facility] or [facility].     Patient will be closer to [facility] but then Dr. Rded would not be able to do the procedures which is fine she will still get the reports. Would probably say due Dr. Cherry because she works closely with him. Or if she prefers Dr. Redd she would have to go to [place].     Routing to  Clerical to take care of.

## 2025-02-27 NOTE — TELEPHONE ENCOUNTER
Patient called and wanted to reschedule to [facility] if she can not go to [facility].  Kept day of 3/18/25 with Dr. Maguire at [facility].

## 2025-03-13 ENCOUNTER — RESULTS FOLLOW-UP (OUTPATIENT)
Dept: GASTROENTEROLOGY | Facility: MEDICAL CENTER | Age: 20
End: 2025-03-13

## 2025-03-13 LAB
C COLI+JEJ+UPSA DNA STL QL NAA+NON-PROBE: NOT DETECTED
C DIFF TOX GENS STL QL NAA+PROBE: NEGATIVE
CALPROTECTIN STL-MCNT: 25 UG/G (ref 0–120)
E COLI O157 DNA STL QL NAA+NON-PROBE: NORMAL
EC STX1+STX2 GENES STL QL NAA+NON-PROBE: NOT DETECTED
ETEC LTA+ST1A+ST1B TOX ST NAA+NON-PROBE: NOT DETECTED
P SHIGELLOIDES DNA STL QL NAA+NON-PROBE: NOT DETECTED
S ENT+BONG DNA STL QL NAA+NON-PROBE: NOT DETECTED
SHIGELLA SP+EIEC IPAH ST NAA+NON-PROBE: NOT DETECTED
V CHOL+PARA+VUL DNA STL QL NAA+NON-PROBE: NOT DETECTED
V CHOLERAE DNA STL QL NAA+NON-PROBE: NOT DETECTED
Y ENTEROCOL DNA STL QL NAA+NON-PROBE: NOT DETECTED

## 2025-03-18 ENCOUNTER — ANESTHESIA EVENT (OUTPATIENT)
Dept: GASTROENTEROLOGY | Facility: HOSPITAL | Age: 20
End: 2025-03-18
Payer: COMMERCIAL

## 2025-03-18 ENCOUNTER — HOSPITAL ENCOUNTER (OUTPATIENT)
Dept: GASTROENTEROLOGY | Facility: HOSPITAL | Age: 20
Setting detail: OUTPATIENT SURGERY
Discharge: HOME | End: 2025-03-18
Attending: INTERNAL MEDICINE
Payer: COMMERCIAL

## 2025-03-18 ENCOUNTER — ANESTHESIA (OUTPATIENT)
Dept: GASTROENTEROLOGY | Facility: HOSPITAL | Age: 20
End: 2025-03-18
Payer: COMMERCIAL

## 2025-03-18 VITALS
SYSTOLIC BLOOD PRESSURE: 131 MMHG | RESPIRATION RATE: 16 BRPM | HEIGHT: 61 IN | OXYGEN SATURATION: 99 % | TEMPERATURE: 97.7 F | DIASTOLIC BLOOD PRESSURE: 66 MMHG | BODY MASS INDEX: 24.6 KG/M2 | HEART RATE: 65 BPM | WEIGHT: 130.29 LBS

## 2025-03-18 DIAGNOSIS — R76.8 POSITIVE AUTOANTIBODY SCREENING FOR CELIAC DISEASE: ICD-10-CM

## 2025-03-18 DIAGNOSIS — R19.7 DIARRHEA, UNSPECIFIED TYPE: ICD-10-CM

## 2025-03-18 LAB
EXT PREGNANCY TEST URINE: NEGATIVE
EXT. CONTROL: NORMAL

## 2025-03-18 PROCEDURE — 88305 TISSUE EXAM BY PATHOLOGIST: CPT | Performed by: PATHOLOGY

## 2025-03-18 PROCEDURE — 81025 URINE PREGNANCY TEST: CPT | Performed by: INTERNAL MEDICINE

## 2025-03-18 RX ORDER — SODIUM CHLORIDE, SODIUM LACTATE, POTASSIUM CHLORIDE, CALCIUM CHLORIDE 600; 310; 30; 20 MG/100ML; MG/100ML; MG/100ML; MG/100ML
INJECTION, SOLUTION INTRAVENOUS CONTINUOUS PRN
Status: DISCONTINUED | OUTPATIENT
Start: 2025-03-18 | End: 2025-03-18

## 2025-03-18 RX ORDER — LIDOCAINE HYDROCHLORIDE 20 MG/ML
INJECTION, SOLUTION EPIDURAL; INFILTRATION; INTRACAUDAL; PERINEURAL AS NEEDED
Status: DISCONTINUED | OUTPATIENT
Start: 2025-03-18 | End: 2025-03-18

## 2025-03-18 RX ORDER — SODIUM CHLORIDE, SODIUM LACTATE, POTASSIUM CHLORIDE, CALCIUM CHLORIDE 600; 310; 30; 20 MG/100ML; MG/100ML; MG/100ML; MG/100ML
125 INJECTION, SOLUTION INTRAVENOUS CONTINUOUS
Status: DISCONTINUED | OUTPATIENT
Start: 2025-03-18 | End: 2025-03-22 | Stop reason: HOSPADM

## 2025-03-18 RX ORDER — PROPOFOL 10 MG/ML
INJECTION, EMULSION INTRAVENOUS AS NEEDED
Status: DISCONTINUED | OUTPATIENT
Start: 2025-03-18 | End: 2025-03-18

## 2025-03-18 RX ADMIN — PROPOFOL 40 MG: 10 INJECTION, EMULSION INTRAVENOUS at 08:47

## 2025-03-18 RX ADMIN — PROPOFOL 40 MG: 10 INJECTION, EMULSION INTRAVENOUS at 08:46

## 2025-03-18 RX ADMIN — PROPOFOL 40 MG: 10 INJECTION, EMULSION INTRAVENOUS at 08:53

## 2025-03-18 RX ADMIN — SODIUM CHLORIDE, SODIUM LACTATE, POTASSIUM CHLORIDE, AND CALCIUM CHLORIDE 125 ML/HR: .6; .31; .03; .02 INJECTION, SOLUTION INTRAVENOUS at 08:00

## 2025-03-18 RX ADMIN — PROPOFOL 40 MG: 10 INJECTION, EMULSION INTRAVENOUS at 08:57

## 2025-03-18 RX ADMIN — PROPOFOL 40 MG: 10 INJECTION, EMULSION INTRAVENOUS at 08:49

## 2025-03-18 RX ADMIN — SODIUM CHLORIDE, SODIUM LACTATE, POTASSIUM CHLORIDE, AND CALCIUM CHLORIDE: .6; .31; .03; .02 INJECTION, SOLUTION INTRAVENOUS at 08:35

## 2025-03-18 RX ADMIN — LIDOCAINE HYDROCHLORIDE 60 MG: 20 INJECTION, SOLUTION EPIDURAL; INFILTRATION; INTRACAUDAL; PERINEURAL at 08:44

## 2025-03-18 RX ADMIN — PROPOFOL 40 MG: 10 INJECTION, EMULSION INTRAVENOUS at 08:50

## 2025-03-18 RX ADMIN — PROPOFOL 40 MG: 10 INJECTION, EMULSION INTRAVENOUS at 08:55

## 2025-03-18 RX ADMIN — PROPOFOL 40 MG: 10 INJECTION, EMULSION INTRAVENOUS at 08:59

## 2025-03-18 RX ADMIN — PROPOFOL 40 MG: 10 INJECTION, EMULSION INTRAVENOUS at 08:45

## 2025-03-18 RX ADMIN — PROPOFOL 40 MG: 10 INJECTION, EMULSION INTRAVENOUS at 08:48

## 2025-03-18 RX ADMIN — PROPOFOL 40 MG: 10 INJECTION, EMULSION INTRAVENOUS at 08:44

## 2025-03-18 RX ADMIN — PROPOFOL 40 MG: 10 INJECTION, EMULSION INTRAVENOUS at 08:51

## 2025-03-18 NOTE — INTERVAL H&P NOTE
H&P reviewed. After examining the patient I find no changes in the patients condition since the H&P had been written.    Vitals:    03/18/25 0752   BP: 150/83   Pulse: 90   Resp: 17   Temp: 98 °F (36.7 °C)   SpO2: 100%

## 2025-03-18 NOTE — ANESTHESIA PREPROCEDURE EVALUATION
Procedure:  COLONOSCOPY  EGD    Relevant Problems   NEURO/PSYCH   (+) Anxiety        Physical Exam    Airway    Mallampati score: I  TM Distance: >3 FB  Neck ROM: full     Dental   No notable dental hx     Cardiovascular      Pulmonary      Other Findings  post-pubertal.      Anesthesia Plan  ASA Score- 2     Anesthesia Type- IV sedation with anesthesia with ASA Monitors.         Additional Monitors:     Airway Plan:            Plan Factors-Exercise tolerance (METS): >4 METS.    Chart reviewed.   Existing labs reviewed. Patient summary reviewed.    Patient is not a current smoker.      There is medical exclusion for perioperative obstructive sleep apnea risk education.        Induction- intravenous.    Postoperative Plan-         Informed Consent- Anesthetic plan and risks discussed with patient.  I personally reviewed this patient with the CRNA. Discussed and agreed on the Anesthesia Plan with the CRNA..      NPO Status:  Vitals Value Taken Time   Date of last liquid 03/17/25 03/18/25 0751   Time of last liquid 2200 03/18/25 0751   Date of last solid 03/16/25 03/18/25 0751   Time of last solid 1800 03/18/25 0751

## 2025-03-21 ENCOUNTER — RESULTS FOLLOW-UP (OUTPATIENT)
Age: 20
End: 2025-03-21

## 2025-03-21 PROCEDURE — 88305 TISSUE EXAM BY PATHOLOGIST: CPT | Performed by: PATHOLOGY

## 2025-03-31 ENCOUNTER — TELEPHONE (OUTPATIENT)
Age: 20
End: 2025-03-31

## 2025-03-31 ENCOUNTER — OFFICE VISIT (OUTPATIENT)
Age: 20
End: 2025-03-31
Payer: COMMERCIAL

## 2025-03-31 VITALS
BODY MASS INDEX: 24.55 KG/M2 | HEIGHT: 61 IN | OXYGEN SATURATION: 98 % | DIASTOLIC BLOOD PRESSURE: 76 MMHG | TEMPERATURE: 98 F | WEIGHT: 130 LBS | HEART RATE: 76 BPM | RESPIRATION RATE: 18 BRPM | SYSTOLIC BLOOD PRESSURE: 118 MMHG

## 2025-03-31 DIAGNOSIS — R12 HEARTBURN: ICD-10-CM

## 2025-03-31 DIAGNOSIS — R76.8 POSITIVE AUTOANTIBODY SCREENING FOR CELIAC DISEASE: Primary | ICD-10-CM

## 2025-03-31 DIAGNOSIS — R19.8 TENESMUS: ICD-10-CM

## 2025-03-31 DIAGNOSIS — R19.7 DIARRHEA, UNSPECIFIED TYPE: ICD-10-CM

## 2025-03-31 DIAGNOSIS — R14.0 BLOATING: ICD-10-CM

## 2025-03-31 PROCEDURE — 99214 OFFICE O/P EST MOD 30 MIN: CPT | Performed by: INTERNAL MEDICINE

## 2025-03-31 RX ORDER — DICYCLOMINE HYDROCHLORIDE 10 MG/1
10 CAPSULE ORAL
Qty: 120 CAPSULE | Refills: 0 | Status: SHIPPED | OUTPATIENT
Start: 2025-03-31 | End: 2025-04-30

## 2025-03-31 RX ORDER — OMEPRAZOLE 40 MG/1
40 CAPSULE, DELAYED RELEASE ORAL DAILY
Qty: 30 CAPSULE | Refills: 1 | Status: SHIPPED | OUTPATIENT
Start: 2025-03-31 | End: 2025-04-11 | Stop reason: SDUPTHER

## 2025-03-31 NOTE — PROGRESS NOTES
Name: Valery Frazier      : 2005      MRN: [id number]  Encounter Provider: Chandrika Redd MD  Encounter Date: 3/31/2025   Encounter department: [facility]  :  Assessment & Plan  Positive autoantibody screening for celiac disease  IgA normal with normal tTG IgA but mildly elevated tTG IgG at 13. EGD with duodenal bx was normal. Not consistent with celiac disease. Discussed that the utility of HLA-DQ2/8 testing but given this won't give the diagnosis of celiac disease, will not pursue at this time. Patient may be at risk of celiac disease but at this time, she does not have celiac disease. Will check her serology again in about a year and monitor her symptoms for now.        Bloating  Intermittent bloating and loose BM. Will rule out SIBO.        Diarrhea, unspecified type  Colonoscopy 3/18/2025 for diarrhea and borderline fecal mak (67 on 2025): normal TI and colon s/p bx showing normal TI and colon, negative for MC  Given ongoing tenesmus and diarrhea, will obtain hydrogen breath test and also obtain MRI enterography to rule out Crohn's disease.   - schedule MRI enterography  - schedule hydrogen breath test  - trial of dicyclomine after breakfast to see if this helps with her symptoms  Orders:    dicyclomine (BENTYL) 10 mg capsule; Take 1 capsule (10 mg total) by mouth 4 (four) times a day (before meals and at bedtime)    MRI enterography w wo; Future    Heartburn  Trial of omeprazole 40 mg daily 30 minutes before breakfast for 8 weeks  Orders:    omeprazole (PriLOSEC) 40 MG capsule; Take 1 capsule (40 mg total) by mouth daily    Tenesmus  See above  Orders:    MRI enterography w wo; Future    RTC after MRI enterography    History of Present Illness   HPI  Valery Frazier is a 20 y.o. female PMH anxiety, ACL surgeries who presents for f/u  History obtained from: patient    Last seen on 2025  Bloating intermittently for the past year. Workup with PCP revealed  "positive ttg IgG (13) but ttg IgA negative.   Given diarrhea and positive ttg IgG, EGD and colonoscopy were pursued.  S/p EGD/colonoscopy at a different campus on 3/18/2025 which were normal and path negative for celiac and MC.    No celiac disease in the family.   Family members with IBS. Mother's cousin with Crohn's disease  Grandfather with prostate cancer    Denies dysphagia, odynophagia, nausea, vomiting, abdominal pain, unintentional weight loss, melena, BRBPR.   Reports heartburn intermittently s/p tums with improvement.     Heartburn very often. Going back and forth chest and stomach.   Still with loose BM. Eating gluten. Especially a couple of hours after breakfast when she goes to school, she has the urge to defecate and has loose BM.     Toxic habits negative x3    Review of Systems  Current Outpatient Medications on File Prior to Visit   Medication Sig Dispense Refill    Aurovela FE 1/20 1-20 MG-MCG per tablet Take 1 tablet by mouth daily      escitalopram (Lexapro) 10 mg tablet Take 1 tablet (10 mg total) by mouth daily 90 tablet 1    polyethylene glycol (GOLYTELY) 4000 mL solution Take 4,000 mL by mouth once for 1 dose 4000 mL 0     No current facility-administered medications on file prior to visit.         Objective   /76 (Patient Position: Sitting, Cuff Size: Standard)   Pulse 76   Temp 98 °F (36.7 °C)   Resp 18   Ht 5' 1\" (1.549 m)   Wt 59 kg (130 lb)   SpO2 98%   BMI 24.56 kg/m²      Physical Exam  Vitals reviewed.   Constitutional:       Appearance: Normal appearance.   HENT:      Head: Normocephalic and atraumatic.   Pulmonary:      Effort: Pulmonary effort is normal. No respiratory distress.   Abdominal:      Palpations: Abdomen is soft.   Skin:     General: Skin is warm and dry.   Neurological:      General: No focal deficit present.      Mental Status: She is alert.           "

## 2025-04-07 ENCOUNTER — CLINICAL SUPPORT (OUTPATIENT)
Dept: GASTROENTEROLOGY | Facility: CLINIC | Age: 20
End: 2025-04-07
Payer: COMMERCIAL

## 2025-04-07 DIAGNOSIS — R12 HEARTBURN: ICD-10-CM

## 2025-04-07 DIAGNOSIS — Z79.01 CHRONIC ANTICOAGULATION: ICD-10-CM

## 2025-04-07 DIAGNOSIS — K63.829 INTESTINAL METHANOGEN OVERGROWTH: ICD-10-CM

## 2025-04-07 DIAGNOSIS — R19.7 DIARRHEA, UNSPECIFIED TYPE: Primary | ICD-10-CM

## 2025-04-07 DIAGNOSIS — R14.0 BLOATING: ICD-10-CM

## 2025-04-07 DIAGNOSIS — K58.0 IRRITABLE BOWEL SYNDROME WITH DIARRHEA: ICD-10-CM

## 2025-04-07 PROCEDURE — PBNCHG PB NO CHARGE PLACEHOLDER: Performed by: INTERNAL MEDICINE

## 2025-04-07 PROCEDURE — 91065 BREATH HYDROGEN/METHANE TEST: CPT | Performed by: INTERNAL MEDICINE

## 2025-04-10 NOTE — PROGRESS NOTES
[facility]       Bacterial Overgrowth Analytical Record    Valery Frazier 20 y.o. female MRN: [id number]      Date of Test: 4-4-25    Substrate Given: Lactulose    Ordering Provider: Kyle Redd MD    Medical Assistant: Vani    Symptoms: diarrhea and bloating    The patient presents for bacterial overgrowth testing.    Patient fasted overnight. Baseline readings obtained.   Breath test performed every 20 min for a total of 3 hr    Sample Clock Time ppmH2 ppmCH4 Co2% Neil   Baseline 6:40 am   6 19 3.4 1.61   #1  20 minutes 7:00 am 8 19 3.4 1.61   #2  40 minutes 7:20 am 10 25 3.3 1.66   #3  60 minutes 7:40 am 10 19 3.4 1.61   #4  80 minutes 8:00 am 14 21 3.2 1.71   #5  100 minutes 8:20 am 24 27 3.5 1.57   #6  120 minutes 8:40 am 49 28 3.9 1.41   #7  140 minutes 9:00 am 77 37 3.7 1.48   #8  160 minutes 9:20 am 120 59 2.7 2.03   #9  180 minutes 9:40 am 101 57 2.5 2.20       Physician interpretation:     Does not meet criteria for small intestinal bacterial overgrowth, H2 does not increase >20 ppm until 2-hour savana which is likely colonic fermentation.    High methane baseline can be seen with intestinal methanogen overgrowth or constipation    Results forwarded to ordering physician.      Please forward to Kyle Redd MD once reviewed, Thank you!

## 2025-04-11 ENCOUNTER — TELEPHONE (OUTPATIENT)
Age: 20
End: 2025-04-11

## 2025-04-11 RX ORDER — OMEPRAZOLE 40 MG/1
40 CAPSULE, DELAYED RELEASE ORAL
Qty: 60 CAPSULE | Refills: 1 | Status: SHIPPED | OUTPATIENT
Start: 2025-04-11 | End: 2025-06-10

## 2025-04-11 NOTE — PROGRESS NOTES
Spoke with the patient over the phone.    Breath test positive for IMO, not SIBO.  Bloating continues. Diarrhea slowed down but still has diarrhea about twice weekly.   Will treat IMO and IBS-D with a 14-day course of rifaximin. Rx sent.    Reports heartburn better on omeprazole 40 mg daily up until today. Will try omeprazole 40 mg BID AC for 8 weeks.    All questions answered.

## 2025-04-11 NOTE — TELEPHONE ENCOUNTER
PA for Xifaxan 550 mg    SUBMITTED to [facility]    via    [x][employer]-Case ID #     [id number]    Payer: [email or web address]   Phone: [phone]         All office notes, labs and other pertaining documents and studies sent. Clinical questions answered. Awaiting determination from insurance company.     Turnaround time for your insurance to make a decision on your Prior Authorization can take 7-21 business days.

## 2025-04-14 DIAGNOSIS — K58.0 IRRITABLE BOWEL SYNDROME WITH DIARRHEA: ICD-10-CM

## 2025-04-14 DIAGNOSIS — K63.829 INTESTINAL METHANOGEN OVERGROWTH: ICD-10-CM

## 2025-04-14 NOTE — TELEPHONE ENCOUNTER
Case ID: [id number]  Note from payer: Approved. XIFAXAN 550MG Tablet is approved from 04/11/2025 to 05/11/2025. All strengths of the drug are approved.    This medication is a possible high dollar medication. The patient has not been contacted regarding the decision as the office clinical team will handle advising the patient and if/any patient assistance that may have been started.  Thank you.     No approval letter recvd at time of approval.

## 2025-04-14 NOTE — TELEPHONE ENCOUNTER
PHARMACY DID NOT RECEIVE     Reason for call:   [x] Refill   [] Prior Auth  [] Other:     Office:   [] PCP/Provider -   [x] Specialty/Provider -     Medication:   rifaximin (XIFAXAN)       Dose/Frequency:  Take 1 tablet (550 mg total) by mouth every 8 (eight) hours for 14 days     Quantity: 42    Pharmacy: [id number] [address]        Local Pharmacy   Does the patient have enough for 3 days?   [] Yes   [x] No - Send as HP to POD    Mail Away Pharmacy   Does the patient have enough for 10 days?   [] Yes   [] No - Send as HP to POD

## 2025-04-16 DIAGNOSIS — F41.9 ANXIETY: ICD-10-CM

## 2025-04-16 RX ORDER — ESCITALOPRAM OXALATE 10 MG/1
10 TABLET ORAL DAILY
Qty: 90 TABLET | Refills: 1 | Status: SHIPPED | OUTPATIENT
Start: 2025-04-16

## 2025-04-27 DIAGNOSIS — R19.7 DIARRHEA, UNSPECIFIED TYPE: ICD-10-CM

## 2025-04-29 RX ORDER — DICYCLOMINE HYDROCHLORIDE 10 MG/1
CAPSULE ORAL
Qty: 120 CAPSULE | Refills: 1 | Status: SHIPPED | OUTPATIENT
Start: 2025-04-29

## 2025-05-19 ENCOUNTER — OFFICE VISIT (OUTPATIENT)
Dept: FAMILY MEDICINE CLINIC | Facility: HOSPITAL | Age: 20
End: 2025-05-19
Payer: COMMERCIAL

## 2025-05-19 VITALS
TEMPERATURE: 97.8 F | BODY MASS INDEX: 25.11 KG/M2 | DIASTOLIC BLOOD PRESSURE: 62 MMHG | WEIGHT: 133 LBS | OXYGEN SATURATION: 99 % | HEART RATE: 86 BPM | HEIGHT: 61 IN | SYSTOLIC BLOOD PRESSURE: 120 MMHG

## 2025-05-19 DIAGNOSIS — M67.40 GANGLION CYST: ICD-10-CM

## 2025-05-19 DIAGNOSIS — J30.1 SEASONAL ALLERGIC RHINITIS DUE TO POLLEN: ICD-10-CM

## 2025-05-19 DIAGNOSIS — F41.9 ANXIETY: ICD-10-CM

## 2025-05-19 DIAGNOSIS — Z00.00 ANNUAL PHYSICAL EXAM: Primary | ICD-10-CM

## 2025-05-19 PROBLEM — M25.461 EFFUSION OF RIGHT KNEE: Status: RESOLVED | Noted: 2023-04-20 | Resolved: 2025-05-19

## 2025-05-19 PROCEDURE — 99395 PREV VISIT EST AGE 18-39: CPT | Performed by: NURSE PRACTITIONER

## 2025-05-19 NOTE — ASSESSMENT & PLAN NOTE
Well controlled on 10 mg lexapro.   She would like to continue on this dose.   Plan to f/u in 1 year and call/message if worsening mood.

## 2025-05-19 NOTE — PATIENT INSTRUCTIONS
"Patient Education     Routine physical for adults   The Basics   Written by the doctors and editors at [facility]   What is a physical? -- A physical is a routine visit, or \"check-up,\" with your doctor. You might also hear it called a \"wellness visit\" or \"preventive visit.\"  During each visit, the doctor will:   Ask about your physical and mental health   Ask about your habits, behaviors, and lifestyle   Do an exam   Give you vaccines if needed   Talk to you about any medicines you take   Give advice about your health   Answer your questions  Getting regular check-ups is an important part of taking care of your health. It can help your doctor find and treat any problems you have. But it's also important for preventing health problems.  A routine physical is different from a \"sick visit.\" A sick visit is when you see a doctor because of a health concern or problem. Since physicals are scheduled ahead of time, you can think about what you want to ask the doctor.  How often should I get a physical? -- It depends on your age and health. In general, for people age 21 years and older:   If you are younger than 50 years, you might be able to get a physical every 3 years.   If you are 50 years or older, your doctor might recommend a physical every year.  If you have an ongoing health condition, like diabetes or high blood pressure, your doctor will probably want to see you more often.  What happens during a physical? -- In general, each visit will include:   Physical exam - The doctor or nurse will check your height, weight, heart rate, and blood pressure. They will also look at your eyes and ears. They will ask about how you are feeling and whether you have any symptoms that bother you.   Medicines - It's a good idea to bring a list of all the medicines you take to each doctor visit. Your doctor will talk to you about your medicines and answer any questions. Tell them if you are having any side effects that bother you. You " "should also tell them if you are having trouble paying for any of your medicines.   Habits and behaviors - This includes:   Your diet   Your exercise habits   Whether you smoke, drink alcohol, or use drugs   Whether you are sexually active   Whether you feel safe at home  Your doctor will talk to you about things you can do to improve your health and lower your risk of health problems. They will also offer help and support. For example, if you want to quit smoking, they can give you advice and might prescribe medicines. If you want to improve your diet or get more physical activity, they can help you with this, too.   Lab tests, if needed - The tests you get will depend on your age and situation. For example, your doctor might want to check your:   Cholesterol   Blood sugar   Iron level   Vaccines - The recommended vaccines will depend on your age, health, and what vaccines you already had. Vaccines are very important because they can prevent certain serious or deadly infections.   Discussion of screening - \"Screening\" means checking for diseases or other health problems before they cause symptoms. Your doctor can recommend screening based on your age, risk, and preferences. This might include tests to check for:   Cancer, such as breast, prostate, cervical, ovarian, colorectal, prostate, lung, or skin cancer   Sexually transmitted infections, such as chlamydia and gonorrhea   Mental health conditions like depression and anxiety  Your doctor will talk to you about the different types of screening tests. They can help you decide which screenings to have. They can also explain what the results might mean.   Answering questions - The physical is a good time to ask the doctor or nurse questions about your health. If needed, they can refer you to other doctors or specialists, too.  Adults older than 65 years often need other care, too. As you get older, your doctor will talk to you about:   How to prevent falling at " home   Hearing or vision tests   Memory testing   How to take your medicines safely   Making sure that you have the help and support you need at home  All topics are updated as new evidence becomes available and our peer review process is complete.  This topic retrieved from [employer] on: May 02, 2024.  Topic 344585 Version 1.0  Release: 32.4.3 - C32.122  © 2024 UpToDate, Inc. and/or its affiliates. All rights reserved.  Consumer Information Use and Disclaimer   Disclaimer: This generalized information is a limited summary of diagnosis, treatment, and/or medication information. It is not meant to be comprehensive and should be used as a tool to help the user understand and/or assess potential diagnostic and treatment options. It does NOT include all information about conditions, treatments, medications, side effects, or risks that may apply to a specific patient. It is not intended to be medical advice or a substitute for the medical advice, diagnosis, or treatment of a health care provider based on the health care provider's examination and assessment of a patient's specific and unique circumstances. Patients must speak with a health care provider for complete information about their health, medical questions, and treatment options, including any risks or benefits regarding use of medications. This information does not endorse any treatments or medications as safe, effective, or approved for treating a specific patient. Up[employer]Date, Inc. and its affiliates disclaim any warranty or liability relating to this information or the use thereof.The use of this information is governed by the Terms of Use, available at https://www.woltersGlamour.com.nguwer.com/en/know/clinical-effectiveness-terms. 2024© UpToDate, Inc. and its affiliates and/or licensors. All rights reserved.  Copyright   © 2024 Up[employer], Inc. and/or its affiliates. All rights reserved.    Patient Education     Mediterranean diet   The Basics   Written by the doctors and  editors at [employer]   What is a Mediterranean diet? -- A Mediterranean diet is a heart-healthy way of eating. It includes foods and cooking styles from many countries around the [place], like [place] and [place]. The exact foods included vary from place to place.  A Mediterranean diet involves eating a lot of fruits, vegetables, nuts, and whole grains. It uses olive oil instead of other fats. It also includes some fish, poultry, and dairy products, but not a lot of red meat.  Wine is often thought of as part of a Mediterranean diet. It is not needed, and you might choose not to include it. If you do drink alcohol, limit the amount to:   For females, no more than 1 drink a day   For males, no more than 2 drinks a day  What are the benefits of a Mediterranean diet? -- A Mediterranean diet can help you:   Improve your overall health, and help you lose weight   Lower your risk of stroke   Lower your risk of heart problems such as a heart attack   Manage your blood sugar if you have diabetes  What can I eat and drink on a Mediterranean diet? -- A Mediterranean diet is more of an eating pattern than a strict diet. Try to cover two-thirds of your plate with fresh fruits and vegetables. Some examples of foods that are often part of this pattern:   Grains - Whole grains like whole-grain bread and pasta, oats, couscous, brown rice, barley, and orzo.   Fruits - Many kinds and colors of fresh, frozen, dried, or canned fruits. Frozen or canned fruits with 100% fruit juice or water (without added sugar). Examples include apples, pears, berries, melons, bananas, plums, raisins, figs, and peaches.   Vegetables - Many kinds and colors of fresh, frozen, or canned vegetables. If canned, low sodium or salt free. If frozen, without added fat and sodium. Examples include avocados, peppers, tomatoes, spinach, kale, beans, carrots, peas, olives, cucumbers, hummus, soybeans, lentils, and kidney beans.   Dairy - Low-fat milk, cheese,  and other dairy products. Greek yogurt, kefir, and plant-based milk alternatives like soy milk.   Lean meats, poultry, seafood, and proteins - [place], tuna, cod, and other fish. Shrimp, clams, scallops, and mussels. White meat chicken and turkey, eggs, dried beans, lentils, and tofu. Nuts such as walnuts, almonds, pecans, hazelnuts, cashews, peanuts, and nut butters. Seeds such as pumpkin, sesame, flax, and sunflower seeds.   Fats, oils, and other foods - Foods with healthy fats found in fish, nuts, and avocados. Olive oil or vegetable oils such as canola oil. Use onions, garlic, spices, and herbs to season food.  What foods and drinks should I avoid or limit on a Mediterranean diet? -- A Mediterranean diet involves avoiding or limiting certain types of foods. Try to avoid foods with additives like artificial sweeteners. Avoid foods that are processed, refined, or preserved. These are often foods with a very long shelf life.   Grains to avoid - White bread, pasta, white rice, crackers, and biscuits.   Fruits to avoid - Fruits canned or frozen with extra sugar.   Vegetables to avoid - Commercially prepared potatoes and vegetable mixes, regular canned vegetables and juices, and vegetables frozen with sauce or pickled vegetables.   Dairy to avoid - Whole-fat dairy products like cheese, ice cream, whole milk, cream, and buttermilk.   Lean meats, poultry, seafood, and proteins to avoid - Red meat such as beef, pork, and lamb. Processed meats such as sausages, deli meats, salami, hot dogs, and diaz.   Fats, oils, and other foods to avoid - Butter, margarine, lard, gravies, sauces, and salad dressing. Cookies, cakes, candy, doughnuts, muffins, and other sweets.  All topics are updated as new evidence becomes available and our peer review process is complete.  This topic retrieved from [employer] on: Apr 04, 2024.  Topic 675670 Version 2.0  Release: 32.2.4 - C32.93  © 2024 Up[employer]Date, Inc. and/or its affiliates. All rights  reserved.  Consumer Information Use and Disclaimer   Disclaimer: This generalized information is a limited summary of diagnosis, treatment, and/or medication information. It is not meant to be comprehensive and should be used as a tool to help the user understand and/or assess potential diagnostic and treatment options. It does NOT include all information about conditions, treatments, medications, side effects, or risks that may apply to a specific patient. It is not intended to be medical advice or a substitute for the medical advice, diagnosis, or treatment of a health care provider based on the health care provider's examination and assessment of a patient's specific and unique circumstances. Patients must speak with a health care provider for complete information about their health, medical questions, and treatment options, including any risks or benefits regarding use of medications. This information does not endorse any treatments or medications as safe, effective, or approved for treating a specific patient. Up[employer], Inc. and its affiliates disclaim any warranty or liability relating to this information or the use thereof.The use of this information is governed by the Terms of Use, available at https://www.woltersJada Beautyuwer.com/en/know/clinical-effectiveness-terms. 2024© UpToDate, Inc. and its affiliates and/or licensors. All rights reserved.  Copyright   © 2024 Up[employer]Date, Inc. and/or its affiliates. All rights reserved.

## 2025-05-19 NOTE — ASSESSMENT & PLAN NOTE
Start daily antihistamine.   If not effective consider return to allergy for further testing/immunotherapy (has had this in the past).

## 2025-05-19 NOTE — PROGRESS NOTES
Adult Annual Physical  Name: Valery Frazier      : 2005      MRN: [id number]  Encounter Provider: TISHA Conner  Encounter Date: 2025   Encounter department: [facility] [address]     :  Assessment & Plan  Annual physical exam         Anxiety  Well controlled on 10 mg lexapro.   She would like to continue on this dose.   Plan to f/u in 1 year and call/message if worsening mood.        Ganglion cyst  I suspect lump on knee is ganglion cyst.   Not bothersome so she would like to leave it alone for now.        Seasonal allergic rhinitis due to pollen  Start daily antihistamine.   If not effective consider return to allergy for further testing/immunotherapy (has had this in the past).        Annual physical exam               Preventive Screenings:  - Diabetes Screening: screening up-to-date  - Cervical cancer screening: screening up-to-date   - Colon cancer screening: screening not indicated   - Lung cancer screening: screening not indicated     Counseling/Anticipatory Guidance:    - Diet: discussed recommendations for a healthy/well-balanced diet.   - Exercise: the importance of regular exercise/physical activity was discussed. Recommend exercise 3-5 times per week for at least 30 minutes.   - Injury prevention: discussed safety/seat belts, safety helmets, smoke detectors, carbon monoxide detectors, and smoking near bedding or upholstery.          History of Present Illness     Adult Annual Physical:  Patient presents for annual physical. Has lump on right knee. She noticed it after her surgery in 2023. Not bothersome. Saw someone at college and had US that showed a cyst.   Used to get allergy shots years ago. Has noticed itchy eyes and runny nose the last few weeks. Not taking any allergy meds. .     Diet and Physical Activity:  - Diet/Nutrition: well balanced diet, consuming 3-5 servings of fruits/vegetables daily, adequate fiber intake and adequate whole grain  "intake.  - Exercise: walking, moderate cardiovascular exercise, vigorous cardiovascular exercise, 3-4 times a week on average and 30-60 minutes on average.    Depression Screening:  - PHQ-2 Score: 0  - PHQ-9 Score: 0    General Health:  - Sleep: 7-8 hours of sleep on average.  - Hearing: normal hearing bilateral ears.  - Vision: no vision problems and wears glasses and contacts.  - Dental: regular dental visits, brushes teeth twice daily, brushes teeth three times daily and floss regularly.    /GYN Health:  - Follows with GYN: yes.   - Last menstrual cycle: 5/19/2025.   - History of STDs: no  - Contraception: oral contraceptives.      Advanced Care Planning:  - Has an advanced directive?: no    - Has a durable medical POA?: no      Review of Systems   Constitutional: Negative.    HENT:  Positive for rhinorrhea. Negative for congestion, dental problem, drooling, ear discharge, ear pain, facial swelling, hearing loss, mouth sores, nosebleeds, postnasal drip, sinus pressure, sinus pain, sneezing, sore throat, tinnitus, trouble swallowing and voice change.    Eyes:  Positive for itching.   Respiratory: Negative.     Cardiovascular: Negative.    Gastrointestinal: Negative.    Endocrine: Negative.    Genitourinary: Negative.    Musculoskeletal: Negative.    Skin: Negative.    Neurological: Negative.    Hematological: Negative.    Psychiatric/Behavioral: Negative.           Objective   /62 (Patient Position: Sitting, Cuff Size: Standard)   Pulse 86   Temp 97.8 °F (36.6 °C) (Tympanic)   Ht 5' 1\" (1.549 m)   Wt 60.3 kg (133 lb)   LMP 05/19/2025   SpO2 99%   BMI 25.13 kg/m²     Physical Exam  Vitals reviewed.   Constitutional:       Appearance: Normal appearance. She is normal weight.   HENT:      Head: Normocephalic and atraumatic.      Right Ear: Tympanic membrane, ear canal and external ear normal.      Left Ear: Tympanic membrane, ear canal and external ear normal.      Nose: Nose normal.      Mouth/Throat: "      Mouth: Mucous membranes are moist.      Pharynx: Oropharynx is clear.     Eyes:      Conjunctiva/sclera: Conjunctivae normal.      Pupils: Pupils are equal, round, and reactive to light.     Neck:      Thyroid: No thyromegaly.     Cardiovascular:      Rate and Rhythm: Normal rate and regular rhythm.      Heart sounds: Normal heart sounds. No murmur heard.  Pulmonary:      Effort: Pulmonary effort is normal.      Breath sounds: Normal breath sounds.   Abdominal:      General: Abdomen is flat. Bowel sounds are normal.      Palpations: Abdomen is soft. There is no hepatomegaly or splenomegaly.      Tenderness: There is no abdominal tenderness.     Musculoskeletal:         General: Normal range of motion.      Cervical back: Normal range of motion and neck supple.      Right knee: Deformity (soft mobile lump palpated lateral aspect) present.     Skin:     General: Skin is warm and dry.      Capillary Refill: Capillary refill takes less than 2 seconds.     Neurological:      General: No focal deficit present.      Mental Status: She is alert and oriented to person, place, and time.     Psychiatric:         Mood and Affect: Mood normal.         Behavior: Behavior normal.         Thought Content: Thought content normal.         Judgment: Judgment normal.

## 2025-06-29 DIAGNOSIS — R19.7 DIARRHEA, UNSPECIFIED TYPE: ICD-10-CM

## 2025-06-30 RX ORDER — DICYCLOMINE HYDROCHLORIDE 10 MG/1
CAPSULE ORAL
Qty: 360 CAPSULE | Refills: 1 | Status: SHIPPED | OUTPATIENT
Start: 2025-06-30